# Patient Record
(demographics unavailable — no encounter records)

---

## 2025-02-19 NOTE — HISTORY OF PRESENT ILLNESS
[FreeTextEntry1] : Patient is a 33yo female with history of headaches.  Patient has long history of headaches which worsened in her 20s.  She presents for evaluation of headaches.  2/19/25 Jacklyn reports doing well, headache well managed with venlafaxine 75mg daily (in the mornings).  She reports taking nurtec approximately 1-2 x monthly with complete relief. No change in characteristics of headaches.  Triggers continue to be disrupted sleep.   Hydrating adequately throughout the day.  6/18/24 Jacklyn is here in follow-up.  Overall headaches are well controlled on Venlafaxine 75mg daily.  She has needed to take approximately 2 Nurtec a month for breakthrough pain.  Reports experiences dull headaches bifrontal approximately once a week that does not require breakthrough medication.  Triggers include change in sleep schedule, rain, stress, and caffeine.  She prefers to take venlafaxine in the mornings for it has kept her up when taking it previously in evenings.  Hydrating adequately with 60-80oz water on weekday, sometimes on weekends not as efficient.  Denies vision changes, numbness, weakness, speech disturbances, dizziness, recent fall or injury.  Remaining neurologic ROS is negative.     Follow up visit with Lisa Schaefer PA-C on 11/17/23 Jacklyn has had overall significant reduction in headaches even from last visit.  She has intermittent low grade headaches that she finds are prone to lifestyle components. She finds that if she is not exercising or is sleeping poorly she can develop a headache.  She notes that she has some photosensitivity and can develop brief transient headaches with bright light exposure like headlights. She is pleased with overall current regimen.  These go away after exposure removed.  She develops intermittent severe headaches approximately every other month.  These headaches are ~6-8/10 and fluctuate in severity.  She often wakes with headaches and will take sumatriptan with limited efficacy.  Headaches will last for 2-3 days when they occur. She affirms that she does not have aura associated with headache.  No other complaints at this time.  Follow up visit with Lisa Schaefer PA-C on 5/5/23 The first two weeks she was not sleeping well but didn't feel poorly rested.  She initially had a lot of sweating but this also improved.  She feels her headaches overall have improved.  She had 5 headaches (two lingering longer than one day) in the last 7 weeks.  She feels a little bit lighter and feels that her mood is somewhat improved.  She is overall pleased with the results and wishes to continue on the current dose.    Frequency: 6 headaches in the last 7 weeks Severity: 5/10, at worse  7-8/10 Duration:Most often upon waking in the morning. At times a few days. Quality: Throbbing  Location: Right side of head Photophobia/Phonophobia: Yes, also sensitive to smells Nausea/Vomiting: Not getting vomiting as freuqently anymore Sensory auras: No  Disabling Deficits: Yes Medication Trial: Amitriptyline (no efficacy), topamax (felt unwell), magnesium (side effects: nausea), no longer takes acetaminophen, effexor (presently prescribed) Alleviating Factors: Ice pack Aggravating Factors: Stress, sleep Positional Component: No   Follow up visit with Lisa Schaefer PA-C on 3/17/23 She has been maintained on sumatriptan PRN with few prophylactic measures as trial in the past.  Some benefit at times but it is inconsistent.  She takes 50mg and feels that it works well, at times she wakes with headaches so it is less effective during those times.  She denies frequent use of abortive medications.    Frequency: Averaging about 7 monthly Severity: 5/10, at worse  7-8/10 Duration:Most often upon waking in the morning. At times a few days. Quality: Throbbing  Location: Right side of head Photophobia/Phonophobia: Yes, also sensitive to smells Nausea/Vomiting: Not getting vomiting as freuqently anymore Sensory auras: No  Disabling Deficits: Yes Medication Trial: Amitriptyline (no efficacy), topamax (felt unwell), magnesium (side effects: nausea), no longer takes acetaminophen  Alleviating Factors: Ice pack Aggravating Factors: Stress, sleep Positional Component: No   Initial consultation with Dr. Berkowitz on 1/13/23 32 yr old female presents for initial evaluation for headaches  She states she has always had headaches her whole life but the severity escalated in her 20's which made her seek medical advice. At that time she recalls getting moderate to severe headache over right frontal region associated with nausea , vomitting, malaise. She had noted that the night prior she usually notices  Initally treated with sumatriptan and amitryptaline  She did not feel amitryptaline was helping her and she was switched to topomax She had side effects and had to stop topomax She tried magnesium as well and she had side effects as well   She takes sumatriptan 50 or 100 mg and if she takes it early and rests for about one hour she feels it is effective  For last 6 months she has noticed with mild to moderate headache daily  with severe migraine 1-2 times per month.   Now she is having milder headaches 8-10 headaches 4/10 frontal headaches and 1-2 times a severe migraine she has to stay in bed. She has increased sumatriptanuse from 4 per month to  6-8 times per month now.   She drinks 80 ounces per day She alternates from working from home or the office which affects her sleep cycle. She has to wake up earlier 3 times per week when she has to go into work  Has had increasing work responsibilities which has added to stress load  no missing meals has gained 10-15 pounds this past year 
Negative

## 2025-02-19 NOTE — DATA REVIEWED
[de-identified] : 2/17/23- MRI brain: No evidence for intracranial mass, acute territorial infarct, acute intracranial hemorrhage or midline shift MRA:  No evidence of any significant stenosis or occlusion of proximal arterial circulation. No evidence of intracranial aneurysm MRV: No evidence of sinus venous thrombosis. Dominant right transverse and sigmoid sinus drainage system which remains patent.

## 2025-02-19 NOTE — PHYSICAL EXAM
[FreeTextEntry1] : Constitutional: Well nourished, well developed adult in no distress\par  Psych: Mood broad, affect not restricted\par  Head: NCAT\par  Eyes: Anicteric\par  Ears: Normal appearing pinna\par  Neck: Normal appearing and not enlarged\par  Lungs: No accessory muscle use, no respiratory distress\par  Cardiac/Vascular: No edema\par  Abdomen: Non-distended\par  Skin: No visible lesions or rashes. Skin is warm and dry\par  \par  Neurological Examination\par  Mental status: Awake, alert, appropriate.  Gives detailed history. oriented to person, place, and time.\par  \par  Cranial nerves  \par  II: Visual acuity grossly intact, visual fields full\par  III, IV, VI: PERRLA, EOMI, no nystagmus \par  V: Facial sensation is normal B/L.    \par  VII: Facial strength is normal B/L.  \par  VIII:   Hearing grossly intact bilaterally to spoken voice although not formally assessed\par  IX, X: Palate is midline and elevates symmetrically.    \par  XI: Trapezius normal strength.    \par  XII: Tongue midline without atrophy or fasciculations.    \par  \par  Speech: No aphasia, dysarthria.  Normal intonation and prosody of speech\par  Motor: Normal muscle bulk and tone throughout.  \par  Muscle Strength: 5/5 muscle strength throughout bilateral upper and lower extremities with assessed shoulder abduction, elbow flexion/extension, hand , hip flexion, knee flexion/extension, ankle plantarflexion/dorsiflexion\par  Reflexes: 2+ reflexes throughout.  \par  Coordination No ataxia - FTN. No tremor or dysmetria\par  Sensory: Sensation to light touch intact bilateral upper and lower extremities\par  Station: No loss of balance.\par  Gait  Normal stance, no loss of balance\par  \par

## 2025-02-19 NOTE — ASSESSMENT
[FreeTextEntry1] : 34 yr old female with history of migraine here for follow-up. MRI/MRA/MRV negative for any structural lesion contributing to headaches. Improvement in headache frequency on current regimen. Patient wishes to continue on current prophylaxis regimen for migraine management as she is pleased with improvement.  -Continue effexor ER 75mg daily for prevention -Nurtec 75mg PRN for headaches, may repeat dose again in 2 hours if needed. - encouraged hydrate adequately with 2 L daily  We discussed medications I prescribed may have adverse effects on a fetus.  If she does plan to become pregnant in future we should discuss risk vs benefit of continuing medications prior.     Follow up in 6 months to 1 year for routine med check.

## 2025-05-09 NOTE — HISTORY OF PRESENT ILLNESS
[de-identified] : Jacklyn is a new patient here for physical. 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 Reports feeling better when exercising consistently.  Currently not exercising or following a particular diet.  Reports having gained a good amount of weight Only other medical history other than migraine headaches which are well-controlled on venlafaxine, is urinary urgency and incontinence.  Denies correlation with stress or caffeine consumption.  Has not seen urogynecology as of yet

## 2025-05-09 NOTE — HEALTH RISK ASSESSMENT
[Good] : ~his/her~  mood as  good [Yes] : Yes [Monthly or less (1 pt)] : Monthly or less (1 point) [1 or 2 (0 pts)] : 1 or 2 (0 points) [Never (0 pts)] : Never (0 points) [0] : 2) Feeling down, depressed, or hopeless: Not at all (0) [PHQ-2 Negative - No further assessment needed] : PHQ-2 Negative - No further assessment needed [HIV test declined] : HIV test declined [Hepatitis C test declined] : Hepatitis C test declined [None] : None [With Family] : lives with family [Employed] : employed [] :  [# Of Children ___] : has [unfilled] children [Feels Safe at Home] : Feels safe at home [Fully functional (bathing, dressing, toileting, transferring, walking, feeding)] : Fully functional (bathing, dressing, toileting, transferring, walking, feeding) [Fully functional (using the telephone, shopping, preparing meals, housekeeping, doing laundry, using] : Fully functional and needs no help or supervision to perform IADLs (using the telephone, shopping, preparing meals, housekeeping, doing laundry, using transportation, managing medications and managing finances) [Smoke Detector] : smoke detector [Carbon Monoxide Detector] : carbon monoxide detector [Never] : Never [Audit-CScore] : 1 [[id number]] : 0 [Reports changes in hearing] : Reports no changes in hearing [Reports changes in vision] : Reports no changes in vision [Reports changes in dental health] : Reports no changes in dental health [PapSmearDate] : 2025

## 2025-05-09 NOTE — ASSESSMENT
[Vaccines Reviewed] : Immunizations reviewed today. Please see immunization details in the vaccine log within the immunization flowsheet.  [FreeTextEntry1] : Preventive medicine -Discussed healthy habits -Will consider consultation with urogyn depending on whether symptoms persist. Currently asymptomatic -Gyn visits utd, paps normal, on ocp and happy with it -Skin check utd -IMM utd -screening labs ordered -F/u one year